# Patient Record
Sex: MALE | Race: WHITE | URBAN - METROPOLITAN AREA
[De-identification: names, ages, dates, MRNs, and addresses within clinical notes are randomized per-mention and may not be internally consistent; named-entity substitution may affect disease eponyms.]

---

## 2019-04-09 ENCOUNTER — HISTORICAL (OUTPATIENT)
Dept: ADMINISTRATIVE | Facility: HOSPITAL | Age: 39
End: 2019-04-09

## 2021-09-20 LAB
BUN SERPL-MCNC: 18 MG/DL (ref 7–18)
CHOLEST SERPL-MCNC: 232 MG/DL
CREAT SERPL-MCNC: 1.06 MG/DL (ref 0.6–1.3)
GLUCOSE SERPL-MCNC: 105 MG/DL (ref 74–106)
HDLC SERPL-MCNC: 46 MG/DL (ref 35–60)
LDLC SERPL CALC-MCNC: 166 MG/DL
TRIGL SERPL-MCNC: 94 MG/DL (ref 30–150)

## 2022-04-07 ENCOUNTER — HISTORICAL (OUTPATIENT)
Dept: ADMINISTRATIVE | Facility: HOSPITAL | Age: 42
End: 2022-04-07

## 2022-04-23 VITALS
SYSTOLIC BLOOD PRESSURE: 124 MMHG | WEIGHT: 221.44 LBS | HEIGHT: 69 IN | OXYGEN SATURATION: 97 % | BODY MASS INDEX: 32.8 KG/M2 | DIASTOLIC BLOOD PRESSURE: 85 MMHG

## 2022-04-30 NOTE — ED PROVIDER NOTES
Patient:   Jase Clark            MRN: [id number]            FIN: [id number]               Age:   38 years     Sex:  Male     :  1980   Associated Diagnoses:   Renal colic on left side   Author:   Luther Collier MD      Basic Information   Time seen: Date & time 2019 11:26:00.   History source: Patient.   Arrival mode: Private vehicle.   History limitation: None.   Additional information: Chief Complaint from Nursing Triage Note : Chief Complaint   2019 11:11 CDT       Chief Complaint           Reports left flank pain onset 3 hours ago. States hx of kidney stones.  .      History of Present Illness   The patient presents with flank pain.  The onset was 4  hours ago.  The course/duration of symptoms is constant.  The character of symptoms is crampy.  The degree at onset was moderate.  The Location of pain at onset was left and flank.  The degree at present is severe.  The Location of pain at present is left and flank.  Radiating pain: none. The exacerbating factor is none.  The relieving factor is none.  Therapy today: none.  Risk factors consist of none.  Associated symptoms: nausea and back pain.  Additional history: none.  Feels like prior kidney stones.        Review of Systems   Constitutional symptoms:  Negative except as documented in HPI.   Skin symptoms:  Negative except as documented in HPI.   Eye symptoms:  Negative except as documented in HPI.   ENMT symptoms:  Negative except as documented in HPI.   Respiratory symptoms:  Negative except as documented in HPI.   Cardiovascular symptoms:  Negative except as documented in HPI.   Gastrointestinal symptoms:  Negative except as documented in HPI.   Genitourinary symptoms:  Negative except as documented in HPI.   Musculoskeletal symptoms:  Negative except as documented in HPI.   Neurologic symptoms:  Negative except as documented in HPI.   Psychiatric symptoms:  Negative except as documented in HPI.   Endocrine  symptoms:  Negative except as documented in HPI.   Hematologic/Lymphatic symptoms:  Negative except as documented in HPI.   Allergy/immunologic symptoms:  Negative except as documented in HPI.             Additional review of systems information: All other systems reviewed and otherwise negative.      Health Status   Allergies:    Allergic Reactions (Selected)  No Known Allergies  No Known Medication Allergies,    Allergies (2) Active Reaction  No Known Allergies None Documented  No Known Medication Allergies None Documented.   Medications:  (Selected)   Prescriptions  Prescribed  Flomax 0.4 mg oral capsule: 0.4 mg = 1 cap(s), Oral, Daily, # 30 cap(s), 0 Refill(s)  Documented Medications  Documented  Fluzone Quadrivalent [id number] intramuscular suspension:   LISINOPRIL 10 MG TABLET: 10 mg = 1 tab(s), Oral, Daily  QUEtiapine 100 mg oral tablet: 100 mg = 1 tab(s), Oral, qPM  QUEtiapine 50 mg oral tablet: 50 mg = 1 tab(s), Oral, qPM  VALSARTAN-HCTZ 80-12.5 MG TAB: 1 tab(s), Oral, Daily  amoxicillin-clavulanate 875 mg-125 mg oral tablet: = 1 tab(s), Oral, BID.      Past Medical/ Family/ Social History   Surgical history:    H/O vasectomy ([id number]) in the month of 2/2016 at 35 Years.  H/O repair of left rotator cuff ([id number]) in 2013 at 33 Years.  Cholecystectomy ([id number]) in 2011 at 31 Years.  Appendectomy ([id number]) in 1995 at 15 Years.  Appendectomy ([id number]).  Cholecystectomy ([id number])..   Family history:    Hypertension.  Father  Mother  .   Social history: Not significant.   Problem list:    Active Problems (3)  HTN (hypertension)   Obesity   Tobacco user .      Physical Examination               Vital Signs   Vital Signs   4/9/2019 11:11 CDT       Temperature Oral          36.3 DegC                             Temperature Oral (calculated)             97.34 DegF                             Peripheral Pulse Rate     76 bpm                             Respiratory Rate          16  br/min                             SpO2                      99 %                             Oxygen Therapy            Room air                             Systolic Blood Pressure   153 mmHg  HI                             Diastolic Blood Pressure  91 mmHg  HI  .   Measurements   4/9/2019 11:11 CDT       Weight Dosing             100 kg                             Weight Measured and Calculated in Lbs     220.46 lb                             Weight Estimated          100 kg                             Height/Length Dosing      177 cm                             Height/Length Estimated   177 cm                             Body Mass Index Estimated 31.92 kg/m2  .   Basic Oxygen Information   4/9/2019 11:11 CDT       SpO2                      99 %                             Oxygen Therapy            Room air  .   General:  Alert, no acute distress.    Skin:  Warm, dry, pink, numerous tattoos.    Head:  Normocephalic, atraumatic.    Neck:  Supple, trachea midline, no tenderness, no JVD, no carotid bruit.    Eye:  Pupils are equal, round and reactive to light, extraocular movements are intact, normal conjunctiva, vision unchanged.    Ears, nose, mouth and throat:  Tympanic membranes clear, oral mucosa moist, no pharyngeal erythema or exudate.    Cardiovascular:  Regular rate and rhythm, No murmur, Normal peripheral perfusion, No edema.    Respiratory:  Lungs are clear to auscultation, respirations are non-labored, breath sounds are equal, Symmetrical chest wall expansion.    Chest wall:  No tenderness, No deformity.    Back:  Nontender, Normal range of motion, Normal alignment.    Musculoskeletal:  Normal ROM, normal strength, no tenderness, no swelling, no deformity.    Gastrointestinal:  Soft, Nontender, Non distended, Normal bowel sounds, No organomegaly.    Neurological:  Alert and oriented to person, place, time, and situation, No focal neurological deficit observed, CN II-XII intact, normal sensory observed,  normal motor observed, normal speech observed, normal coordination observed.    Lymphatics:  No lymphadenopathy.   Psychiatric:  Cooperative, appropriate mood & affect, normal judgment.       Medical Decision Making   Orders  Launch Orders   Laboratory:  BMP (Order Processing): Stat collect, 4/9/2019 11:30 CDT, Blood, Once, Stop date 4/9/2019 11:30 CDT, Lab Collect, 4/9/2019 11:30 CDT  CBC w/ Auto Diff (Order Processing): Stat collect, 4/9/2019 11:30 CDT, Blood, Once, Stop date 4/9/2019 11:30 CDT, Lab Collect, 4/9/2019 11:30 CDT  Pharmacy:  Dilaudid (Order Processing): 2 mg, IV Push, Once, first dose 4/9/2019 11:31 CDT, stop date 4/9/2019 11:31 CDT, STAT  Zofran 2 mg/mL injectable solution (Order Processing): 4 mg, form: Injection, IV Push, Once, first dose 4/9/2019 11:31 CDT, stop date 4/9/2019 11:31 CDT, STAT  Sodium Chloride 0.9% 1000mL 1000 mL (Order Processing): 1,000 mL, 1,000 mL, IV, Bolus, start date 4/9/2019 11:31 CDT  ketorolac 30 mg for IV Push (Order Processing): 30 mg, form: Injection, IV Push, Once, first dose 4/9/2019 11:31 CDT, stop date 4/9/2019 11:31 CDT, STAT  Radiology:  CT Abdomen and Pelvis W/O Contrast (Order Processing): Stat, 4/9/2019 11:31 CDT, R/O Stones, None, Stretcher, Rad Type, Schedule this test.   Results review:  Lab results : Lab View   4/9/2019 11:29 CDT       Sodium Lvl                137 mmol/L                             Potassium Lvl             3.9 mmol/L                             Chloride                  104 mmol/L                             CO2                       23.0 mmol/L                             Calcium Lvl               8.9 mg/dL                             Glucose Lvl               103 mg/dL                             BUN                       14.0 mg/dL                             Creatinine                0.98 mg/dL                             BUN/Creat Ratio           14  NA                             eGFR-AA                   >60 mL/min/1.73 m2  NA                              eGFR-RYAN                  >60 mL/min/1.73 m2  NA                             WBC                       5.7 x10(3)/mcL                             RBC                       5.32 x10(6)/mcL                             Hgb                       15.0 gm/dL                             Hct                       45.7 %                             Platelet                  236 x10(3)/mcL                             MCV                       85.9 fL                             MCH                       28.2 pg                             MCHC                      32.8 gm/dL  LOW                             RDW                       13.5 %                             MPV                       9.5 fL                             Abs Neut                  3.69 x10(3)/mcL                             Neutro Auto               64.6 %                             Lymph Auto                26.1 %                             Mono Auto                 8.6 %                             Eos Auto                  0.0 %                             Abs Eos                   0.00 x10(3)/mcL                             Basophil Auto             0.5 %                             Abs Neutro                3.69 x10(3)/mcL                             Abs Lymph                 1.49 x10(3)/mcL                             Abs Mono                  0.49 x10(3)/mcL                             Abs Baso                  0.03 x10(3)/mcL                             NRBC%                     0.0 %                             IG%                       0.200 %                             IG#                       0.0100    4/9/2019 11:17 CDT       UA Appear                 CLEAR                             UA Color                  STRAW                             UA Spec Grav              1.020                             UA Bili                   Negative                             UA pH                     6.0                              UA Urobilinogen           Negative                             UA Blood                  Negative                             UA Glucose                Negative                             UA Ketones                Negative                             UA Protein                Negative                             UA Nitrite                Negative                             UA Leuk Est               Negative  .   Radiology results:  Rad Results (ST)  < 12 hrs   Accession: [id number]  Order: CT Abdomen and Pelvis W/O Contrast  Report Dt/Tm: 04/09/2019 12:25  Report:   EXAMINATION  CT Abdomen and Pelvis W/O Contrast     TECHNIQUE  Helical-acquisition CT images were obtained without the intravenous  administration of iodinated contrast media. Oral contrast was not  utilized.  Multiplanar reformats were accomplished by a CT technologist at a  separate workstation and pushed to PACS for physician review.     Total DLP (mGy-cm): 1616.7 (value may include radiation due to  concomitantly performed CT imaging)  Automated tube current modulation and/or weight-based exposure dosing  is utilized, when appropriate, to reach lowest reasonably achievable  exposure rate.     INDICATION  Acute onset left flank pain, history of renal stones     Comparison: 10 October, 2017     FINDINGS  Images were reviewed in soft tissue, lung, and bone windows.  Overall assessment of the soft tissues and vasculature is limited in  the absence of contrast agent(s).     Lines/Tubes: None visualized.     THORACIC  Heart: No acute or focal abnormality.  Lung Bases: No acute or focal abnormality.     HEPATOBILIARY  Liver: No acute or focal abnormality.  Gallbladder: Surgically absent.  Bile ducts: No convincing obstructive process. No focal filling defect  or evidence for extrinsic compression.  Pancreas: No acute or focal abnormality.  Spleen: No acute or focal abnormality.     GENITOURINARY  Adrenal glands: Subcentimeter right lateral limb  nodule is unchanged  in size and CT appearance; internal density is less than 0 HU,  essentially diagnostic for benign lipid-rich adenoma. The left adrenal  is unremarkable.  Kidneys/Ureters: Approximately 3 mm right lower pole calcification is  not obstructing. Multiple punctate nonobstructing left calyceal stones  are also evident. There is no distal urolithiasis or suggestion of  obstructive uropathy. No expansile renal lesion or complex cyst is  identified.  Urinary bladder: No focal wall thickening or convincing intraluminal  abnormality.  Reproductive: No acute or focal abnormality.     GASTROINTESTINAL  Esophagus: No acute or focal abnormality.  Stomach: No acute or focal abnormality.  Small bowel: No focal abnormality or suggestion of high-grade  obstruction.  Appendix: Surgical changes of appendectomy noted.  Large bowel: No acute or focal abnormality.     Mesentery/Omentum: No acute or focal abnormality.  Peritoneal cavity: No free fluid or air.  Retroperitoneum: No acute or focal lesion. No drainable fluid  collection.     Vasculature: No acute or focal abnormality.  Lymph nodes: No pathologic enlargement or necrotic process.     MUSCULOSKELETAL  Body wall: No acute or focal abnormality.  Musculature: No acute or new focal abnormality is identified.  Osseous: No acute or focal abnormality.     IMPRESSION  1.  Bilateral nonobstructing nephrolithiasis without evidence of  distal urolithiasis or obstructive uropathy.     2.  No compelling CT evidence for acute abdominopelvic pathology.  Unchanged incidental findings described above.    .      Reexamination/ Reevaluation   Vital signs   Basic Oxygen Information   4/9/2019 11:11 CDT       SpO2                      99 %                             Oxygen Therapy            Room air        Impression and Plan   Diagnosis   Renal colic on left side ([id number])   Plan   Condition: Improved.    Disposition: Discharged: Time  4/9/2019 13:00:00, to home.     Prescriptions: Launch prescriptions   Pharmacy:  [place] 7.5 mg-325 mg oral tablet (Prescribe): 1 tab(s), Oral, q6hr, PRN PRN for pain, X 7 day(s), # 28 tab(s), 0 Refill(s)  ketorolac 10 mg oral tablet (Prescribe): 10 mg = 1 tab(s), Oral, q6hr, PRN PRN for pain, X 5 day(s), # 20 tab(s), 0 Refill(s), Pharmacy: [facility]  Flomax 0.4 mg oral capsule (Prescribe): 0.4 mg = 1 cap(s), Oral, Daily, # 15 cap(s), 0 Refill(s), Pharmacy: [facility]  VALSARTAN-HCTZ 80-12.5 MG TAB (Discontinue): 4/9/2019 13:00 CDT  Flomax 0.4 mg oral capsule (Discontinue): 4/9/2019 13:01 CDT  amoxicillin-clavulanate 875 mg-125 mg oral tablet (Discontinue): 4/9/2019 13:01 CDT  QUEtiapine 50 mg oral tablet (Discontinue): 4/9/2019 13:00 CDT  Fluzone Quadrivalent [id number] intramuscular suspension (Discontinue): 4/9/2019 13:00 CDT.    Patient was given the following educational materials: Renal Colic, Renal Colic.    Limitations: Limited activity.    Follow up with: ; Follow up with your urologist within one week if symptoms are not improved.    Counseled: Patient, Regarding diagnosis, Regarding diagnostic results, Regarding treatment plan, Regarding prescription, Patient indicated understanding of instructions.

## 2022-05-05 ENCOUNTER — TELEPHONE (OUTPATIENT)
Dept: FAMILY MEDICINE | Facility: CLINIC | Age: 42
End: 2022-05-05

## 2022-05-05 NOTE — TELEPHONE ENCOUNTER
----- Message from Daisy Coyle sent at 5/4/2022  2:22 PM CDT -----  Regarding: Lab orders  Type:  Needs Medical Advice    Who Called: Enmanuel Del Angel   Symptoms (please be specific):    How long has patient had these symptoms:    Pharmacy name and phone #:    Would the patient rather a call back or a response via [facility]?   Best Call Back Number: [phone]  Additional Information: Needs last lab results faxed to [phone]

## 2022-09-15 ENCOUNTER — HISTORICAL (OUTPATIENT)
Dept: ADMINISTRATIVE | Facility: HOSPITAL | Age: 42
End: 2022-09-15

## 2022-10-12 DIAGNOSIS — I10 HYPERTENSION, UNSPECIFIED TYPE: Primary | ICD-10-CM

## 2022-10-12 RX ORDER — LISINOPRIL 10 MG/1
TABLET ORAL
Qty: 90 TABLET | Refills: 3 | Status: SHIPPED | OUTPATIENT
Start: 2022-10-12